# Patient Record
(demographics unavailable — no encounter records)

---

## 2024-12-09 NOTE — HISTORY OF PRESENT ILLNESS
[de-identified] : Mr. Neal is a33 year M with PMH of asthma and obesity who comes for a physical. Quit drinking about 2 weeks ago. Smoke daily ~ 10 cigarettes,

## 2024-12-09 NOTE — HISTORY OF PRESENT ILLNESS
[de-identified] : Mr. Neal is a33 year M with PMH of asthma and obesity who comes for a physical. Quit drinking about 2 weeks ago. Smoke daily ~ 10 cigarettes,

## 2024-12-09 NOTE — HEALTH RISK ASSESSMENT
[Little interest or pleasure doing things] : 1) Little interest or pleasure doing things [Feeling down, depressed, or hopeless] : 2) Feeling down, depressed, or hopeless [0] : 2) Feeling down, depressed, or hopeless: Not at all (0) [PHQ-2 Negative - No further assessment needed] : PHQ-2 Negative - No further assessment needed [de-identified] : I spend 5 min performing a depression screening on this patient [[id number]] : 0 [Current] : Current [10-14] : 10-14

## 2024-12-09 NOTE — HEALTH RISK ASSESSMENT
[Little interest or pleasure doing things] : 1) Little interest or pleasure doing things [Feeling down, depressed, or hopeless] : 2) Feeling down, depressed, or hopeless [0] : 2) Feeling down, depressed, or hopeless: Not at all (0) [PHQ-2 Negative - No further assessment needed] : PHQ-2 Negative - No further assessment needed [de-identified] : I spend 5 min performing a depression screening on this patient [[id number]] : 0 [Current] : Current [10-14] : 10-14